# Patient Record
Sex: FEMALE | Race: ASIAN | NOT HISPANIC OR LATINO | URBAN - METROPOLITAN AREA
[De-identification: names, ages, dates, MRNs, and addresses within clinical notes are randomized per-mention and may not be internally consistent; named-entity substitution may affect disease eponyms.]

---

## 2021-08-18 ENCOUNTER — INPATIENT (INPATIENT)
Facility: HOSPITAL | Age: 56
LOS: 1 days | Discharge: HOME | End: 2021-08-20
Attending: HOSPITALIST | Admitting: HOSPITALIST
Payer: MEDICAID

## 2021-08-18 VITALS
RESPIRATION RATE: 20 BRPM | TEMPERATURE: 98 F | OXYGEN SATURATION: 96 % | SYSTOLIC BLOOD PRESSURE: 133 MMHG | DIASTOLIC BLOOD PRESSURE: 65 MMHG | HEART RATE: 81 BPM

## 2021-08-18 NOTE — ED ADULT TRIAGE NOTE - CHIEF COMPLAINT QUOTE
pt c/o having LUQ pain for 2 days . denies fevers,chills,n/v. pmh colon perforation, colon resection.

## 2021-08-19 DIAGNOSIS — Z90.49 ACQUIRED ABSENCE OF OTHER SPECIFIED PARTS OF DIGESTIVE TRACT: Chronic | ICD-10-CM

## 2021-08-19 LAB
ALBUMIN SERPL ELPH-MCNC: 4.6 G/DL — SIGNIFICANT CHANGE UP (ref 3.3–5)
ALP SERPL-CCNC: 100 U/L — SIGNIFICANT CHANGE UP (ref 40–120)
ALT FLD-CCNC: 19 U/L — SIGNIFICANT CHANGE UP (ref 4–33)
ANION GAP SERPL CALC-SCNC: 14 MMOL/L — SIGNIFICANT CHANGE UP (ref 7–14)
APPEARANCE UR: CLEAR — SIGNIFICANT CHANGE UP
APTT BLD: 31.9 SEC — SIGNIFICANT CHANGE UP (ref 27–36.3)
AST SERPL-CCNC: 17 U/L — SIGNIFICANT CHANGE UP (ref 4–32)
B PERT DNA SPEC QL NAA+PROBE: SIGNIFICANT CHANGE UP
B PERT+PARAPERT DNA PNL SPEC NAA+PROBE: SIGNIFICANT CHANGE UP
BASOPHILS # BLD AUTO: 0.04 K/UL — SIGNIFICANT CHANGE UP (ref 0–0.2)
BASOPHILS NFR BLD AUTO: 0.3 % — SIGNIFICANT CHANGE UP (ref 0–2)
BILIRUB SERPL-MCNC: 0.4 MG/DL — SIGNIFICANT CHANGE UP (ref 0.2–1.2)
BILIRUB UR-MCNC: NEGATIVE — SIGNIFICANT CHANGE UP
BLD GP AB SCN SERPL QL: NEGATIVE — SIGNIFICANT CHANGE UP
BORDETELLA PARAPERTUSSIS (RAPRVP): SIGNIFICANT CHANGE UP
BUN SERPL-MCNC: 7 MG/DL — SIGNIFICANT CHANGE UP (ref 7–23)
C PNEUM DNA SPEC QL NAA+PROBE: SIGNIFICANT CHANGE UP
CALCIUM SERPL-MCNC: 10 MG/DL — SIGNIFICANT CHANGE UP (ref 8.4–10.5)
CHLORIDE SERPL-SCNC: 102 MMOL/L — SIGNIFICANT CHANGE UP (ref 98–107)
CO2 SERPL-SCNC: 24 MMOL/L — SIGNIFICANT CHANGE UP (ref 22–31)
COLOR SPEC: YELLOW — SIGNIFICANT CHANGE UP
CREAT SERPL-MCNC: 0.66 MG/DL — SIGNIFICANT CHANGE UP (ref 0.5–1.3)
DIFF PNL FLD: NEGATIVE — SIGNIFICANT CHANGE UP
EOSINOPHIL # BLD AUTO: 0.1 K/UL — SIGNIFICANT CHANGE UP (ref 0–0.5)
EOSINOPHIL NFR BLD AUTO: 0.9 % — SIGNIFICANT CHANGE UP (ref 0–6)
FLUAV SUBTYP SPEC NAA+PROBE: SIGNIFICANT CHANGE UP
FLUBV RNA SPEC QL NAA+PROBE: SIGNIFICANT CHANGE UP
GLUCOSE SERPL-MCNC: 97 MG/DL — SIGNIFICANT CHANGE UP (ref 70–99)
GLUCOSE UR QL: NEGATIVE — SIGNIFICANT CHANGE UP
HADV DNA SPEC QL NAA+PROBE: SIGNIFICANT CHANGE UP
HCOV 229E RNA SPEC QL NAA+PROBE: SIGNIFICANT CHANGE UP
HCOV HKU1 RNA SPEC QL NAA+PROBE: SIGNIFICANT CHANGE UP
HCOV NL63 RNA SPEC QL NAA+PROBE: SIGNIFICANT CHANGE UP
HCOV OC43 RNA SPEC QL NAA+PROBE: SIGNIFICANT CHANGE UP
HCT VFR BLD CALC: 44.3 % — SIGNIFICANT CHANGE UP (ref 34.5–45)
HGB BLD-MCNC: 14.3 G/DL — SIGNIFICANT CHANGE UP (ref 11.5–15.5)
HMPV RNA SPEC QL NAA+PROBE: SIGNIFICANT CHANGE UP
HPIV1 RNA SPEC QL NAA+PROBE: SIGNIFICANT CHANGE UP
HPIV2 RNA SPEC QL NAA+PROBE: SIGNIFICANT CHANGE UP
HPIV3 RNA SPEC QL NAA+PROBE: SIGNIFICANT CHANGE UP
HPIV4 RNA SPEC QL NAA+PROBE: SIGNIFICANT CHANGE UP
IANC: 7.49 K/UL — SIGNIFICANT CHANGE UP (ref 1.5–8.5)
IMM GRANULOCYTES NFR BLD AUTO: 0.3 % — SIGNIFICANT CHANGE UP (ref 0–1.5)
INR BLD: 1.06 RATIO — SIGNIFICANT CHANGE UP (ref 0.88–1.16)
KETONES UR-MCNC: NEGATIVE — SIGNIFICANT CHANGE UP
LEUKOCYTE ESTERASE UR-ACNC: NEGATIVE — SIGNIFICANT CHANGE UP
LIDOCAIN IGE QN: 30 U/L — SIGNIFICANT CHANGE UP (ref 7–60)
LYMPHOCYTES # BLD AUTO: 27.1 % — SIGNIFICANT CHANGE UP (ref 13–44)
LYMPHOCYTES # BLD AUTO: 3.13 K/UL — SIGNIFICANT CHANGE UP (ref 1–3.3)
M PNEUMO DNA SPEC QL NAA+PROBE: SIGNIFICANT CHANGE UP
MCHC RBC-ENTMCNC: 29.7 PG — SIGNIFICANT CHANGE UP (ref 27–34)
MCHC RBC-ENTMCNC: 32.3 GM/DL — SIGNIFICANT CHANGE UP (ref 32–36)
MCV RBC AUTO: 91.9 FL — SIGNIFICANT CHANGE UP (ref 80–100)
MONOCYTES # BLD AUTO: 0.73 K/UL — SIGNIFICANT CHANGE UP (ref 0–0.9)
MONOCYTES NFR BLD AUTO: 6.3 % — SIGNIFICANT CHANGE UP (ref 2–14)
NEUTROPHILS # BLD AUTO: 7.49 K/UL — HIGH (ref 1.8–7.4)
NEUTROPHILS NFR BLD AUTO: 65.1 % — SIGNIFICANT CHANGE UP (ref 43–77)
NITRITE UR-MCNC: NEGATIVE — SIGNIFICANT CHANGE UP
NRBC # BLD: 0 /100 WBCS — SIGNIFICANT CHANGE UP
NRBC # FLD: 0 K/UL — SIGNIFICANT CHANGE UP
PH UR: 6 — SIGNIFICANT CHANGE UP (ref 5–8)
PLATELET # BLD AUTO: 218 K/UL — SIGNIFICANT CHANGE UP (ref 150–400)
POTASSIUM SERPL-MCNC: 3.9 MMOL/L — SIGNIFICANT CHANGE UP (ref 3.5–5.3)
POTASSIUM SERPL-SCNC: 3.9 MMOL/L — SIGNIFICANT CHANGE UP (ref 3.5–5.3)
PROT SERPL-MCNC: 7.5 G/DL — SIGNIFICANT CHANGE UP (ref 6–8.3)
PROT UR-MCNC: NEGATIVE — SIGNIFICANT CHANGE UP
PROTHROM AB SERPL-ACNC: 12.2 SEC — SIGNIFICANT CHANGE UP (ref 10.6–13.6)
RAPID RVP RESULT: SIGNIFICANT CHANGE UP
RBC # BLD: 4.82 M/UL — SIGNIFICANT CHANGE UP (ref 3.8–5.2)
RBC # FLD: 13 % — SIGNIFICANT CHANGE UP (ref 10.3–14.5)
RH IG SCN BLD-IMP: POSITIVE — SIGNIFICANT CHANGE UP
RSV RNA SPEC QL NAA+PROBE: SIGNIFICANT CHANGE UP
RV+EV RNA SPEC QL NAA+PROBE: SIGNIFICANT CHANGE UP
SARS-COV-2 RNA SPEC QL NAA+PROBE: SIGNIFICANT CHANGE UP
SODIUM SERPL-SCNC: 140 MMOL/L — SIGNIFICANT CHANGE UP (ref 135–145)
SP GR SPEC: 1.01 — SIGNIFICANT CHANGE UP (ref 1.01–1.02)
UROBILINOGEN FLD QL: SIGNIFICANT CHANGE UP
WBC # BLD: 11.53 K/UL — HIGH (ref 3.8–10.5)
WBC # FLD AUTO: 11.53 K/UL — HIGH (ref 3.8–10.5)

## 2021-08-19 PROCEDURE — 74177 CT ABD & PELVIS W/CONTRAST: CPT | Mod: 26

## 2021-08-19 RX ORDER — KETOROLAC TROMETHAMINE 30 MG/ML
30 SYRINGE (ML) INJECTION ONCE
Refills: 0 | Status: DISCONTINUED | OUTPATIENT
Start: 2021-08-19 | End: 2021-08-19

## 2021-08-19 RX ORDER — SODIUM CHLORIDE 9 MG/ML
1000 INJECTION INTRAMUSCULAR; INTRAVENOUS; SUBCUTANEOUS ONCE
Refills: 0 | Status: COMPLETED | OUTPATIENT
Start: 2021-08-19 | End: 2021-08-19

## 2021-08-19 RX ORDER — ACETAMINOPHEN 500 MG
1000 TABLET ORAL EVERY 6 HOURS
Refills: 0 | Status: DISCONTINUED | OUTPATIENT
Start: 2021-08-19 | End: 2021-08-20

## 2021-08-19 RX ORDER — ACETAMINOPHEN 500 MG
1000 TABLET ORAL ONCE
Refills: 0 | Status: COMPLETED | OUTPATIENT
Start: 2021-08-19 | End: 2021-08-19

## 2021-08-19 RX ADMIN — SODIUM CHLORIDE 250 MILLILITER(S): 9 INJECTION INTRAMUSCULAR; INTRAVENOUS; SUBCUTANEOUS at 08:42

## 2021-08-19 RX ADMIN — Medication 400 MILLIGRAM(S): at 14:17

## 2021-08-19 RX ADMIN — Medication 30 MILLIGRAM(S): at 02:30

## 2021-08-19 RX ADMIN — SODIUM CHLORIDE 1000 MILLILITER(S): 9 INJECTION INTRAMUSCULAR; INTRAVENOUS; SUBCUTANEOUS at 02:30

## 2021-08-19 NOTE — ED PROVIDER NOTE - ATTENDING CONTRIBUTION TO CARE
I performed a face to face evaluation of this patient and obtained a history and performed a full exam.  I agree with the history, physical exam and plan of the PA.  REceived signout from acp- originally acp only case- pt with abd pain, h/o surgery. belly soft but with mild ttp left side, nondistended, for ct, labs, and reassess

## 2021-08-19 NOTE — ED PROVIDER NOTE - PROGRESS NOTE DETAILS
michael pgy1 - assumed care of pt. pt doing well in less pain still awaiting CT PT's belly still soft nontender, awaiting surgery consult.

## 2021-08-19 NOTE — ED ADULT NURSE NOTE - OBJECTIVE STATEMENT
56 year old female presents A&Ox4, complaining of left lower quadrant abdominal pain 6/10 that began 2 days ago accompanied with nausea, pt denies any nausea or diarrhea. Pt states her last bowel movement was yesterday but she is unsure of consistency. Respirations even and unlabored, speaking in full sentences without any difficulty, sating 100% on room air, no accessory muscle use. Pt denies any hematuria, chest pain, dyspnea, dizziness, blurry vision, nausea, vomiting or diarrhea. 20g IV established in right AC, labs drawn and sent as per MD order. Pt in NAD at this time. Medications administered as per MD order. bed in lowest position, side rails up, call bell in hand, safety maintained. awaiting further orders. will continue to monitor.

## 2021-08-19 NOTE — ED ADULT NURSE NOTE - NSIMPLEMENTINTERV_GEN_ALL_ED
Implemented All Universal Safety Interventions:  [place] to call system. Call bell, personal items and telephone within reach. Instruct patient to call for assistance. Room bathroom lighting operational. Non-slip footwear when patient is off stretcher. Physically safe environment: no spills, clutter or unnecessary equipment. Stretcher in lowest position, wheels locked, appropriate side rails in place.

## 2021-08-19 NOTE — ED PROVIDER NOTE - OBJECTIVE STATEMENT
57 y/o M with hx of HTN, pmhx of colon resection 2/2 perforation presenting with a c/o LLQ pain associated with nause x 2 days. She denies fever, chills, n/v, dysuria, blood in stool. 57 y/o M with hx of HTN, pmhx of colon resection 2/2 perforation presenting with a c/o LLQ pain and left mid abd pain associated with nausea x 2 days. She denies fever, chills, n/v, dysuria, blood in stool.

## 2021-08-19 NOTE — ED ADULT NURSE REASSESSMENT NOTE - NS ED NURSE REASSESS COMMENT FT1
Pt A&Ox4, resting in bed. Respirations even and unlabored, speaking in full sentences without any difficulty, sating 100% on room air. Pt denies any chest pain, dyspnea, dizziness, blurry vision, nausea, vomiting or diarrhea. Pt in NAD at this time. Pt awaiting CT scan. bed in lowest position, side rails up, call bell in hand, safety maintained. will continue to monitor.

## 2021-08-19 NOTE — ED CDU PROVIDER INITIAL DAY NOTE - MEDICAL DECISION MAKING DETAILS
Pt with abdominal pain with finding on CT concern for lipoma, necrosis, unclear if other etiology-seen by surgery consult- surgical oncology and recommended gi to see pt.  Pt with intermittent pain and declining narcotics agreeable to iv tylenol

## 2021-08-19 NOTE — ED CDU PROVIDER INITIAL DAY NOTE - PROGRESS NOTE DETAILS
Patient feeling better, pain controlled. All results discussed at this time. Surgery cleared patient, requesting GI consult. Patient upset, spoke with family and patient in regards to staying for GI consult tomorrow. Discussed concerns including but not limited to lipoma vs mass - patient agrees to stay for further assessment by GI. Vital signs stable. GI consulted via email for nonemergent consult.     Spoke with radiology in regards to MR for further evaluation of mass - radiology does not think MR with and without contrast with change plan at this time. Patient is aware and has no questions

## 2021-08-19 NOTE — ED CDU PROVIDER INITIAL DAY NOTE - ATTENDING CONTRIBUTION TO CARE
I performed a face to face evaluation of this patient and obtained a history and performed a full exam.  I agree with the history, physical exam and plan of the PA. I performed a face to face evaluation of this patient and obtained a history and performed a full exam.  I agree with the history, physical exam and plan of the PA.  Pt with abdominal pain with finding on CT concern for lipoma, necrosis, unclear if other etiology-seen by surgery consult- surgical oncology and recommended gi to see pt.  Pt with intermittent pain and declining narcotics agreeable to iv tylenol. Abdomen with minimal ttp- left upper abd ttp mild without rebound and is intermittent.

## 2021-08-19 NOTE — ED PROVIDER NOTE - CLINICAL SUMMARY MEDICAL DECISION MAKING FREE TEXT BOX
patient with hx of colon resection presenting with LLQ pain. will obtain routine labs, analgeisc, CT A/P

## 2021-08-19 NOTE — CONSULT NOTE ADULT - ASSESSMENT
56F presenting with abdominal pain found to have an intraabdominal lipoma.    PLAN:  - Would recommend GI consultation for possible EGD +/- EUS/biopsy for further work-up of intraabdominal lipoma.  - Further surgical oncology recommendations pending further work-up of lipoma.    Discussed with attending surgeon Dr. Abida Fermin.    OUMAR Salcedo, PGY-2  [facility]  D Team Surgery  [id number]

## 2021-08-19 NOTE — ED ADULT NURSE NOTE - NSFALLRSKASSESSDT_ED_ALL_ED
Called and spoke to patients guardian and mother Jluy to schedule medicare wellness visit.  July dos not want to bring patient in again until her 10.18 appointment with Dr. Pastor.  Asked July if it was okay to do medicare wellness at the 10/18 visit.  July agreed.  
19-Aug-2021 02:45

## 2021-08-20 ENCOUNTER — RESULT REVIEW (OUTPATIENT)
Age: 56
End: 2021-08-20

## 2021-08-20 VITALS
OXYGEN SATURATION: 95 % | DIASTOLIC BLOOD PRESSURE: 77 MMHG | HEART RATE: 68 BPM | SYSTOLIC BLOOD PRESSURE: 126 MMHG | RESPIRATION RATE: 19 BRPM

## 2021-08-20 DIAGNOSIS — Z29.9 ENCOUNTER FOR PROPHYLACTIC MEASURES, UNSPECIFIED: ICD-10-CM

## 2021-08-20 DIAGNOSIS — R10.9 UNSPECIFIED ABDOMINAL PAIN: ICD-10-CM

## 2021-08-20 DIAGNOSIS — Z86.79 PERSONAL HISTORY OF OTHER DISEASES OF THE CIRCULATORY SYSTEM: ICD-10-CM

## 2021-08-20 LAB
ANION GAP SERPL CALC-SCNC: 12 MMOL/L — SIGNIFICANT CHANGE UP (ref 7–14)
BASOPHILS # BLD AUTO: 0.02 K/UL — SIGNIFICANT CHANGE UP (ref 0–0.2)
BASOPHILS NFR BLD AUTO: 0.2 % — SIGNIFICANT CHANGE UP (ref 0–2)
BUN SERPL-MCNC: 8 MG/DL — SIGNIFICANT CHANGE UP (ref 7–23)
CALCIUM SERPL-MCNC: 8.9 MG/DL — SIGNIFICANT CHANGE UP (ref 8.4–10.5)
CHLORIDE SERPL-SCNC: 106 MMOL/L — SIGNIFICANT CHANGE UP (ref 98–107)
CO2 SERPL-SCNC: 21 MMOL/L — LOW (ref 22–31)
CREAT SERPL-MCNC: 0.59 MG/DL — SIGNIFICANT CHANGE UP (ref 0.5–1.3)
EOSINOPHIL # BLD AUTO: 0.12 K/UL — SIGNIFICANT CHANGE UP (ref 0–0.5)
EOSINOPHIL NFR BLD AUTO: 1.4 % — SIGNIFICANT CHANGE UP (ref 0–6)
GLUCOSE SERPL-MCNC: 104 MG/DL — HIGH (ref 70–99)
HCT VFR BLD CALC: 40.8 % — SIGNIFICANT CHANGE UP (ref 34.5–45)
HGB BLD-MCNC: 13.5 G/DL — SIGNIFICANT CHANGE UP (ref 11.5–15.5)
IANC: 6.33 K/UL — SIGNIFICANT CHANGE UP (ref 1.5–8.5)
IMM GRANULOCYTES NFR BLD AUTO: 0.4 % — SIGNIFICANT CHANGE UP (ref 0–1.5)
LYMPHOCYTES # BLD AUTO: 1.62 K/UL — SIGNIFICANT CHANGE UP (ref 1–3.3)
LYMPHOCYTES # BLD AUTO: 19.1 % — SIGNIFICANT CHANGE UP (ref 13–44)
MCHC RBC-ENTMCNC: 30.3 PG — SIGNIFICANT CHANGE UP (ref 27–34)
MCHC RBC-ENTMCNC: 33.1 GM/DL — SIGNIFICANT CHANGE UP (ref 32–36)
MCV RBC AUTO: 91.7 FL — SIGNIFICANT CHANGE UP (ref 80–100)
MONOCYTES # BLD AUTO: 0.37 K/UL — SIGNIFICANT CHANGE UP (ref 0–0.9)
MONOCYTES NFR BLD AUTO: 4.4 % — SIGNIFICANT CHANGE UP (ref 2–14)
NEUTROPHILS # BLD AUTO: 6.33 K/UL — SIGNIFICANT CHANGE UP (ref 1.8–7.4)
NEUTROPHILS NFR BLD AUTO: 74.5 % — SIGNIFICANT CHANGE UP (ref 43–77)
NRBC # BLD: 0 /100 WBCS — SIGNIFICANT CHANGE UP
NRBC # FLD: 0 K/UL — SIGNIFICANT CHANGE UP
PLATELET # BLD AUTO: 174 K/UL — SIGNIFICANT CHANGE UP (ref 150–400)
POTASSIUM SERPL-MCNC: 3.8 MMOL/L — SIGNIFICANT CHANGE UP (ref 3.5–5.3)
POTASSIUM SERPL-SCNC: 3.8 MMOL/L — SIGNIFICANT CHANGE UP (ref 3.5–5.3)
RBC # BLD: 4.45 M/UL — SIGNIFICANT CHANGE UP (ref 3.8–5.2)
RBC # FLD: 12.7 % — SIGNIFICANT CHANGE UP (ref 10.3–14.5)
SODIUM SERPL-SCNC: 139 MMOL/L — SIGNIFICANT CHANGE UP (ref 135–145)
WBC # BLD: 8.49 K/UL — SIGNIFICANT CHANGE UP (ref 3.8–10.5)
WBC # FLD AUTO: 8.49 K/UL — SIGNIFICANT CHANGE UP (ref 3.8–10.5)

## 2021-08-20 PROCEDURE — 99222 1ST HOSP IP/OBS MODERATE 55: CPT | Mod: 25

## 2021-08-20 PROCEDURE — 43239 EGD BIOPSY SINGLE/MULTIPLE: CPT | Mod: 59,GC

## 2021-08-20 PROCEDURE — 99223 1ST HOSP IP/OBS HIGH 75: CPT

## 2021-08-20 PROCEDURE — 43259 EGD US EXAM DUODENUM/JEJUNUM: CPT | Mod: GC

## 2021-08-20 PROCEDURE — 88305 TISSUE EXAM BY PATHOLOGIST: CPT | Mod: 26

## 2021-08-20 RX ORDER — ACETAMINOPHEN 500 MG
2 TABLET ORAL
Qty: 0 | Refills: 0 | DISCHARGE

## 2021-08-20 NOTE — DISCHARGE NOTE PROVIDER - NSDCCPCAREPLAN_GEN_ALL_CORE_FT
PRINCIPAL DISCHARGE DIAGNOSIS  Diagnosis: Abdominal pain  Assessment and Plan of Treatment: Please follow up with your primary care physician regarding your hospitalization and take all medications prescribed.

## 2021-08-20 NOTE — H&P ADULT - HISTORY OF PRESENT ILLNESS
55 y/o female with PMHx of HTN, remote colon resection for perforation presents with a 3 day history of abdominal pain. Patient states she developed sharp LLQ abdominal pain on Monday. Pain initially constant lasting for hours, 7 out of 10, resolving on its own. Pain was associated with nausea, no vomiting. No constipation, no fever, no chills.   Patient went to urgent care and PCP who found rebound tenderness on exam and advised her to go to ED. Patient denies fatigue, weakness, weight change, chest pain, difficulty breathing, change in bowel movements, dysuria, change in urination, lower extremity edema, hemiparesis, change in gait, rashes.     In the ED, CT of Abdomen and Pelvis showed a 3.0e5b7jd intraabdominal lipoma vs fat necrosis. Surgery and GI consulted for further evaluation.

## 2021-08-20 NOTE — DISCHARGE NOTE NURSING/CASE MANAGEMENT/SOCIAL WORK - NSDCPEFALRISK_GEN_ALL_CORE
For information on Fall & injury Prevention, visit [email or web address]

## 2021-08-20 NOTE — CONSULT NOTE ADULT - ASSESSMENT
56F w/ PMHx of HTN and remote colon resection for perforated diverticulitis? who presents with a 3 day history of abdominal pain and nausea, no vomiting.   CTAP revealed a 3.4x5f2nx intraabdominal lipoma vs fat necrosis. Surgery consulted for further evaluation.  GI consult was called for evaluation    Mehul Reeves   Gastroenterology Fellow  Pager: [phone]  Please call answering service [phone] / on-call GI fellow after 5pm and before 8am, and on weekends. 56F w/ PMHx of HTN and remote colon resection for perforated diverticulitis? who presents with a 3 day history of abdominal pain and nausea, no vomiting.   CTAP revealed a 3.3x2c2ao intraabdominal lipoma vs fat necrosis.   GI consult was called for evaluation    Mehul Reeves   Gastroenterology Fellow  Pager: [phone]  Please call answering service [phone] / on-call GI fellow after 5pm and before 8am, and on weekends. 56F w/ PMHx of HTN and remote colon resection for perforated diverticulitis? who presents with a 3 day history of abdominal pain and nausea, no vomiting.   CTAP revealed a 3.0y9l2fs intraabdominal lipoma vs fat necrosis.   GI consult was called for evaluation    Impression:  # Abdominal lesion likely lipoma / fat necrosis but less likely causing patient's pain    Recommendations:  - NPO  - EGD + EUS today for further evaluation     Mehlu Reeves MD  Gastroenterology Fellow  Pager: [phone]  Please call answering service [phone] / on-call GI fellow after 5pm and before 8am, and on weekends.

## 2021-08-20 NOTE — CHART NOTE - NSCHARTNOTEFT_GEN_A_CORE
Procedure note:  S/P EGD - normal stomach and duodenum  inlet patches noted in esophagus bx obtained. Otherwise esophagus was normal  s/p EUS: increased fat content was noted. But no lesions or mass were seen. No bx    Recs:  Advance diet as tolerated

## 2021-08-20 NOTE — DISCHARGE NOTE PROVIDER - HOSPITAL COURSE
57 y/o female with PMHx of HTN, remote colon resection for perforation presents with a 3 day history of abdominal pain. Patient states she developed sharp LLQ abdominal pain on Monday. Pain initially constant lasting for hours, 7 out of 10, resolving on its own. Pain was associated with nausea, no vomiting. No constipation, no fever, no chills.   Patient went to urgent care and PCP who found rebound tenderness on exam and advised her to go to ED. Patient denies fatigue, weakness, weight change, chest pain, difficulty breathing, change in bowel movements, dysuria, change in urination, lower extremity edema, hemiparesis, change in gait, rashes.     In the ED, CT of Abdomen and Pelvis showed a 3.7t7b9bl intraabdominal lipoma vs fat necrosis. Surgery and GI consulted for further evaluation.  This may represent a lipoma or a focus of fat necrosis. surgery consulted recommended GI c/s who did EGD and EUS inlet patches noted in esophagus and bx. no lesion noted in stomach. advance diet. Pt states she feels better and if tolerates diet and cleared from GI perspective can be dc'ed home with outpt f/u with GI and PCP .    As per attending, patient stable for discharge.

## 2021-08-20 NOTE — DISCHARGE NOTE PROVIDER - CARE PROVIDER_API CALL
Nash Cuenca)  Gastroenterology; Internal Medicine  [address] [place] [id number]  Phone: [phone]  Fax: [phone]  Follow Up Time: 2 weeks

## 2021-08-20 NOTE — ED CDU PROVIDER SUBSEQUENT DAY NOTE - ATTENDING CONTRIBUTION TO CARE
CDU MD ALONSO:  I performed a face to face bedside interview with patient regarding history of present illness, review of symptoms and past medical history. I completed an independent physical exam.  I have discussed patient's plan of care with PA.   I agree with note as stated above, having amended the EMR as needed to reflect my findings. I have discussed the assessment and plan of care.  This includes during the time I functioned as the attending physician for this patient.

## 2021-08-20 NOTE — ED CDU PROVIDER DISPOSITION NOTE - CLINICAL COURSE
57 y/o female with pmhx of HTN, colon resection, presents to ED c/o LLQ pain. CT with lipoma vs fat necrosis, cleared by surgery. seen by GI in CDU and suggesting admission for scope. pt amenable for admission

## 2021-08-20 NOTE — H&P ADULT - ASSESSMENT
55 y/o female with PMHx of HTN, remote colon resection for perforation presents with a 3 day history of abdominal pain. Admitted for pain management.     In the ED, CT of Abdomen and Pelvis showed a 3.4r8p8fc intraabdominal lipoma vs fat necrosis. Surgery and GI consulted for further evaluation.

## 2021-08-20 NOTE — CONSULT NOTE ADULT - SUBJECTIVE AND OBJECTIVE BOX
Surgical Oncology Consult Note  Attending: Dr. Beaulieu  Service: D Team Surgery  [id number]    HPI: 56F w/ PMHx of HTN and remote colon resection for perforated diverticulitis? who presents with a 3 day history of abdominal pain and nausea, no vomiting. Patient states her pain persisted thus she presented to the ED for evaluation.    In the ED, patient was afebrile hemodynamically stable, labs remarkable for mild leukocytosis of 11.5k, otherwise the rest of the labs were WNL. CTAP revealed a 3.8y1m2rs intraabdominal lipoma vs fat necrosis. Surgery consulted for further evaluation.    Patient has never had a colonoscopy despite her hx of colectomy.    PAST MEDICAL & SURGICAL HISTORY:  Benign essential HTN  S/P colon resection    ALLERGIES:  No Known Allergies    SOCIAL HISTORY:  Active smoker of 1pack/day for 40 years  Occasional alcohol intake  No illicit drug use    PHYSICAL EXAM:  General: NAD, resting comfortably  HEENT: NC/AT, EOMI, normal hearing, no oral lesions, no LAD, neck supple  Pulmonary: normal resp effort, CTA-B  Cardiovascular: NSR, no murmurs  Abdominal: soft, nondistended, mild epigastric and LUQ tenderness to palpation, no organomegaly  Extremities: WWP, normal strength, no clubbing/cyanosis/edema  Pulses: palpable distal pulses    VITAL SIGNS:  Vital Signs Last 24 Hrs  T(C): 36.4 (19 Aug 2021 12:58), Max: 36.9 (18 Aug 2021 22:17)  T(F): 97.6 (19 Aug 2021 12:58), Max: 98.4 (18 Aug 2021 22:17)  HR: 63 (19 Aug 2021 12:58) (61 - 81)  BP: 131/52 (19 Aug 2021 12:58) (114/51 - 133/75)  BP(mean): --  RR: 16 (19 Aug 2021 12:58) (16 - 20)  SpO2: 100% (19 Aug 2021 12:58) (96% - 100%)    I&O's Summary      LABS:                        14.3   11.53 )-----------( 218      ( 19 Aug 2021 02:45 )             44.3     08-19    140  |  102  |  7   ----------------------------<  97  3.9   |  24  |  0.66    Ca    10.0      19 Aug 2021 02:45    TPro  7.5  /  Alb  4.6  /  TBili  0.4  /  DBili  x   /  AST  17  /  ALT  19  /  AlkPhos  100  08-19    PT/INR - ( 19 Aug 2021 02:45 )   PT: 12.2 sec;   INR: 1.06 ratio         PTT - ( 19 Aug 2021 02:45 )  PTT:31.9 sec  Urinalysis Basic - ( 19 Aug 2021 07:57 )    Color: Yellow / Appearance: Clear / S.014 / pH: x  Gluc: x / Ketone: Negative  / Bili: Negative / Urobili: <2 mg/dL   Blood: x / Protein: Negative / Nitrite: Negative   Leuk Esterase: Negative / RBC: x / WBC x   Sq Epi: x / Non Sq Epi: x / Bacteria: x      CAPILLARY BLOOD GLUCOSE        LIVER FUNCTIONS - ( 19 Aug 2021 02:45 )  Alb: 4.6 g/dL / Pro: 7.5 g/dL / ALK PHOS: 100 U/L / ALT: 19 U/L / AST: 17 U/L / GGT: x           RADIOLOGY & ADDITIONAL STUDIES:    CT Abdomen and Pelvis w/ IV Cont (21 @ 09:58)    FINDINGS:  LOWER CHEST: A 0.4 cm left lower lobe pulmonary nodule (2:6). Bibasilar atelectasis.    LIVER: Within normal limits.  BILE DUCTS: Normal caliber.  GALLBLADDER: Within normal limits.  SPLEEN: Within normal limits.  PANCREAS: Within normal limits.  ADRENALS: Within normal limits.  KIDNEYS/URETERS: Within normal limits.    BLADDER: Within normal limits.  REPRODUCTIVE ORGANS: Myomatous uterus.    BOWEL: Large bowel anastomosis. No bowel obstruction. Appendix not visualized. Colonic diverticulosis.  PERITONEUM: A 3.8 cm focus of fat associated with the greater curvature of the stomach with mild adjacent fatty stranding. Small free fluid in the pelvis with a sliver in the right lower quadrant.  VESSELS: Vascular calcification of the aorta and its branches.  RETROPERITONEUM/LYMPH NODES: No lymphadenopathy.  ABDOMINAL WALL: Fat-containing umbilical hernia.  BONES: Degenerative changes of the spine.    IMPRESSION:  A fat-containing lesion in the upper abdomen adjacent to the stomach. This may represent a lipoma or a focus of fat necrosis. Comparison with any prior imaging if available would be helpful.  Small amount of free fluid in the pelvis of uncertain etiology.
    HPI:  56F w/ PMHx of HTN and remote colon resection for perforated diverticulitis? who presents with a 3 day history of abdominal pain and nausea, no vomiting. Patient states her pain persisted thus she presented to the ED for evaluation.    In the ED, patient was afebrile hemodynamically stable, labs remarkable for mild leukocytosis of 11.5k, otherwise the rest of the labs were WNL.   CTAP revealed a 3.2w4p4ef intraabdominal lipoma vs fat necrosis. Surgery consulted for further evaluation.  Patient has never had a colonoscopy despite her hx of colectomy.      Allergies:  No Known Allergies    Home Medications:  none    Hospital Medications:  acetaminophen  IVPB .. 1000 milliGRAM(s) IV Intermittent every 6 hours PRN      PMHX/PSHX:  Benign essential HTN    S/P colon resection        Family history:      Social History: no smoking    ROS:   General:  No fevers, chills or night sweats  ENT:  No sore throat or dysphagia  CV:  No pain or palpitations  Resp:  No dyspnea, cough or  wheezing  GI:  as above  Skin:  No rash or edema  Neuro: no weakness   Hematologic: no bleeding  Musculoskeletal: no muscle pain or join pain  Psych: no agitation     : no dysuria      PHYSICAL EXAM:   GENERAL:  NAD, Appears stated age  HEENT:  NC/AT,  conjunctivae clear and pink, sclera -anicteric  CHEST:  CTA B/L, Normal effort  HEART:  RRR S1/S2,  ABDOMEN:  Soft, non-tender, non-distended,  no masses   EXTREMITIES:  No cyanosis or Edema  SKIN:  Warm & Dry. No rash or erythema  NEURO:  Alert, oriented, no focal deficit    Vital Signs:  Vital Signs Last 24 Hrs  T(C): 36.8 (20 Aug 2021 06:00), Max: 36.8 (20 Aug 2021 06:00)  T(F): 98.2 (20 Aug 2021 06:00), Max: 98.2 (20 Aug 2021 06:00)  HR: 66 (20 Aug 2021 06:00) (63 - 68)  BP: 127/78 (20 Aug 2021 06:00) (124/62 - 133/67)  BP(mean): --  RR: 17 (20 Aug 2021 06:00) (16 - 18)  SpO2: 100% (20 Aug 2021 06:00) (98% - 100%)  Daily     Daily     LABS:                        13.5   8.49  )-----------( 174      ( 20 Aug 2021 07:05 )             40.8     Mean Cell Volume: 91.7 fL (21 @ 07:05)        139  |  106  |  8   ----------------------------<  104<H>  3.8   |  21<L>  |  0.59    Ca    8.9      20 Aug 2021 07:05    TPro  7.5  /  Alb  4.6  /  TBili  0.4  /  DBili  x   /  AST  17  /  ALT  19  /  AlkPhos  100  08-    LIVER FUNCTIONS - ( 19 Aug 2021 02:45 )  Alb: 4.6 g/dL / Pro: 7.5 g/dL / ALK PHOS: 100 U/L / ALT: 19 U/L / AST: 17 U/L / GGT: x           PT/INR - ( 19 Aug 2021 02:45 )   PT: 12.2 sec;   INR: 1.06 ratio         PTT - ( 19 Aug 2021 02:45 )  PTT:31.9 sec  Urinalysis Basic - ( 19 Aug 2021 07:57 )    Color: Yellow / Appearance: Clear / S.014 / pH: x  Gluc: x / Ketone: Negative  / Bili: Negative / Urobili: <2 mg/dL   Blood: x / Protein: Negative / Nitrite: Negative   Leuk Esterase: Negative / RBC: x / WBC x   Sq Epi: x / Non Sq Epi: x / Bacteria: x                              13.5   8.49  )-----------( 174      ( 20 Aug 2021 07:05 )             40.8                         14.3   11.53 )-----------( 218      ( 19 Aug 2021 02:45 )             44.3     Imaging:  < from: CT Abdomen and Pelvis w/ IV Cont (21 @ 09:58) >    EXAM:  CT ABDOMEN AND PELVIS IC        PROCEDURE DATE:  Aug 19 2021         INTERPRETATION:  CLINICAL INFORMATION: Left lower quadrant pain.    COMPARISON: None.    CONTRAST/COMPLICATIONS:  IV Contrast: Omnipaque 350  90 cc administered   10 cc discarded  Oral Contrast: NONE  Complications: None reported at time of study completion    PROCEDURE:  CT of the Abdomen and Pelvis was performed.  Sagittal and coronal reformats were performed.    FINDINGS:  LOWER CHEST: A 0.4 cm left lower lobe pulmonary nodule (2:6). Bibasilar atelectasis.    LIVER: Within normal limits.  BILE DUCTS: Normal caliber.  GALLBLADDER: Within normal limits.  SPLEEN: Within normal limits.  PANCREAS: Within normal limits.  ADRENALS: Within normal limits.  KIDNEYS/URETERS: Within normal limits.    BLADDER: Within normal limits.  REPRODUCTIVE ORGANS: Myomatous uterus.    BOWEL: Large bowel anastomosis. No bowel obstruction. Appendix not visualized. Colonic diverticulosis.  PERITONEUM: A 3.8 cm focus of fat associated with the greater curvature of the stomach with mild adjacent fatty stranding. Small free fluid in the pelvis with a sliver in the right lower quadrant.  VESSELS: Vascular calcification of the aorta and its branches.  RETROPERITONEUM/LYMPH NODES: No lymphadenopathy.  ABDOMINAL WALL: Fat-containing umbilical hernia.  BONES: Degenerative changes of the spine.    IMPRESSION:    A fat-containing lesion in the upper abdomen adjacent to the stomach. This may represent a lipoma or a focus of fat necrosis. Comparison with any prior imaging if available would be helpful.  Small amount of free fluid in the pelvis of uncertain etiology.    --- End of Report ---    < end of copied text >

## 2021-08-20 NOTE — ED CDU PROVIDER SUBSEQUENT DAY NOTE - HISTORY
this is a 56 y.o female with a PMhx of HTN not on medication, colon resection 2/2, presented to the ED complainig of having LLQ pain.  pt has been eating and drinking without difficulties, denies having any vomiting. She also had a normal bowel movment. Patient denies having any fever, chills, bodyaches, headaches, dizziness. Was found to have lipoma vs. necrosis on ct, seen and cleared by surgery, advised to be seen by GI. patient pending GI evaluation.

## 2021-08-20 NOTE — H&P ADULT - PROBLEM SELECTOR PLAN 2
- Patient states she had remote history of HTN 20 years ago.  - States has had normal blood pressure for the last 20 years - not taking any blood pressure medications.

## 2021-08-20 NOTE — H&P ADULT - PROBLEM SELECTOR PLAN 1
Abdominal pain - GI consulted for abdominal pain recommended: - NPO,  EGD + EUS today for further evaluation   - CT of abdomen showed a 3.1c9n0ox intraabdominal lipoma vs fat necrosis.  - f/u labs - CBC, CMP, Lactate  - Monitor Stool for C-diff if watery, PCR, Culture, Ova and Parasite, Stool Occult for blood  - PO or IV pain meds for relief  - Surgery consulted recommended : GI consultation for possible EGD +/- EUS/biopsy for further work-up of   intraabdominal lipoma.  - Further surgical oncology recommendations pending further work-up of lipoma.

## 2021-08-20 NOTE — DISCHARGE NOTE NURSING/CASE MANAGEMENT/SOCIAL WORK - PATIENT PORTAL LINK FT
You can access the FollowMyHealth Patient Portal offered by [facility] by registering at the following website: [email or web address]. By joining [employer]’s FollowMyHealth portal, you will also be able to view your health information using other applications (apps) compatible with our system.

## 2021-08-20 NOTE — ED CDU PROVIDER SUBSEQUENT DAY NOTE - MEDICAL DECISION MAKING DETAILS
this is a 56 y.o female with a PMhx of HTN not on medication, colon resection 2/2, presented to the ED complainig of having LLQ pain. abdominal pain w/ ct finding of lipoma vs. necrosis-pending GI eval

## 2021-08-20 NOTE — CHART NOTE - NSCHARTNOTEFT_GEN_A_CORE
Spoke to GI fellow, patient is ok to leave from GI perspective and followup as out patient.  Patient tolerated PO and wants to go home. Attending aware.

## 2021-08-20 NOTE — ED CDU PROVIDER SUBSEQUENT DAY NOTE - PROGRESS NOTE DETAILS
Pt with mild abd pain.  Improved from yesterday.  Pending gi eval and recs. As per GI, suggesting to admit for scope. pt uncooperative in CDU, unhappy with nurse taking vitals and states "you aren't doing anything for me." I discussed all of patient's results, answered all questions, reviewed surgery and GI consults with patient and reviewed plan with pt as GI suggesting admission for scope, pt amenable for admission. pt not wearing mask in hallway. Pt yelling and disrupting other 12 CDU patients, tried to deescalate patient. charge RN Adriana made aware, will admit to medicine. As per GI, suggesting to admit for scope. pt uncooperative in CDU, unhappy with getting vitals checked and states "you aren't doing anything for me." I discussed all of patient's results, answered all questions, reviewed surgery and GI consults with patient and reviewed plan with pt as GI suggesting admission for scope, pt amenable for admission. pt not wearing mask in hallway. Pt yelling and disrupting other 11 CDU patients, tried to deescalate patient. charge RN Adriana made aware, will admit to medicine. As per GI, suggesting to admit for scope. pt uncooperative in CDU, unhappy with getting vitals checked and states "you aren't doing anything for me." I discussed all of patient's results, answered all questions, reviewed surgery and GI consults with patient and reviewed plan with pt as GI suggesting admission for scope, however pt states "I want nothing to do with you." pt is amenable for admission. pt not wearing mask in hallway. Pt yelling and disrupting other 11 CDU patients, tried to deescalate patient. charge EVE Wright made aware, will admit to medicine.

## 2021-08-20 NOTE — CONSULT NOTE ADULT - ATTENDING COMMENTS
Patient presented with abdominal pain and ? incidental finding perigastric fatty mass. Agree with EGD/ EUS to further evaluate. Unclear whether this could be contributing to her pain. If outpatient workup is preferred OK from surgical oncology perspective to discharge and follow up in office.
70 year old female with abnormal imaging. 3 cm area posterior to the stomach consistent with fat.    Plan: EUS.

## 2021-08-20 NOTE — H&P ADULT - ATTENDING COMMENTS
55 yo F w/ hx HTN, colon perforation p/w abdominal pain in LLQ. CT A/P A fat-containing lesion in the upper abdomen adjacent to the stomach. This may represent a lipoma or a focus of fat necrosis. surgery consulted recommended GI c/s who did EGD and EUS inlet patches noted in esophagus and bx. no lesion noted in stomach. advance diet. Pt states she feels better and if tolerates diet and cleared from GI perspective can be dc'ed home with outpt f/u with GI and PCP

## 2021-08-25 ENCOUNTER — TRANSCRIPTION ENCOUNTER (OUTPATIENT)
Age: 56
End: 2021-08-25

## 2021-12-10 PROBLEM — Z00.00 ENCOUNTER FOR PREVENTIVE HEALTH EXAMINATION: Status: ACTIVE | Noted: 2021-12-10

## 2024-02-12 NOTE — H&P ADULT - NSHPPOASURGSITEINCISION_GEN_ALL_CORE
Pt admitted after a fall and on the ground for ~16 hours   - CK elevated 3579 -> 931 -> 696, no need to further monitor  - s/p 500cc bolus in ED and gentle hydration, NS 50cc/hr   - on lasix 20mg PO QD at home, holding at this time   - leukocytosis likely reactive to fall  - Cr downtrending  - Pt does not want cardiology consult Pt admitted after a fall and on the ground for ~16 hours   - CK elevated 3579 -> 931 -> 696, no need to further monitor  - s/p 500cc bolus in ED and gentle hydration, NS 50cc/hr   - on lasix 20mg PO QD at home, holding at this time   - leukocytosis likely reactive to fall  - Cr downtrending  - Pt does not want cardiology consult    Dispo: d/c planning to [facility] if pt agreeable no

## 2025-02-26 ENCOUNTER — NON-APPOINTMENT (OUTPATIENT)
Age: 60
End: 2025-02-26

## 2025-03-25 NOTE — H&P ADULT - PROBLEM/PLAN-2
DISCHARGE SUMMARY:  Discharge Time: 1328  Via:  Ambulatory  Verbal Instructions given: Yes  Verbalized understanding: Yes  Written Instructions given: Yes  Discharged Stable Per MD Order: Yes  
DISPLAY PLAN FREE TEXT

## 2025-05-20 ENCOUNTER — OFFICE (OUTPATIENT)
Facility: LOCATION | Age: 60
Setting detail: OPHTHALMOLOGY
End: 2025-05-20
Payer: COMMERCIAL

## 2025-05-20 DIAGNOSIS — H25.13: ICD-10-CM

## 2025-05-20 DIAGNOSIS — D31.40: ICD-10-CM

## 2025-05-20 DIAGNOSIS — H18.523: ICD-10-CM

## 2025-05-20 PROCEDURE — 92004 COMPRE OPH EXAM NEW PT 1/>: CPT | Performed by: STUDENT IN AN ORGANIZED HEALTH CARE EDUCATION/TRAINING PROGRAM

## 2025-05-20 PROCEDURE — 92020 GONIOSCOPY: CPT | Performed by: STUDENT IN AN ORGANIZED HEALTH CARE EDUCATION/TRAINING PROGRAM

## 2025-05-20 ASSESSMENT — TONOMETRY
OD_IOP_MMHG: 13
OS_IOP_MMHG: 13

## 2025-05-20 ASSESSMENT — VISUAL ACUITY
OS_BCVA: 20/30-2
OD_BCVA: 20/25-3

## 2025-05-20 ASSESSMENT — REFRACTION_AUTOREFRACTION
OD_SPHERE: +2.00
OD_CYLINDER: -1.00
OS_AXIS: 099
OS_SPHERE: +1.75
OS_CYLINDER: -1.00
OD_AXIS: 089

## 2025-05-20 ASSESSMENT — KERATOMETRY
METHOD_AUTO_MANUAL: AUTO
OS_K1POWER_DIOPTERS: 42.75
OS_AXISANGLE_DEGREES: 010
OD_K2POWER_DIOPTERS: 42.75
OD_K1POWER_DIOPTERS: 42.50
OD_AXISANGLE_DEGREES: 018
OS_K2POWER_DIOPTERS: 43.00

## 2025-05-20 ASSESSMENT — CONFRONTATIONAL VISUAL FIELD TEST (CVF)
OS_FINDINGS: FULL
OD_FINDINGS: FULL